# Patient Record
Sex: FEMALE | Race: WHITE | Employment: FULL TIME | URBAN - METROPOLITAN AREA
[De-identification: names, ages, dates, MRNs, and addresses within clinical notes are randomized per-mention and may not be internally consistent; named-entity substitution may affect disease eponyms.]

---

## 2022-09-15 ENCOUNTER — HOSPITAL ENCOUNTER (OUTPATIENT)
Age: 22
Discharge: HOME | End: 2022-09-15

## 2022-12-12 ENCOUNTER — NURSE TRIAGE (OUTPATIENT)
Dept: OTHER | Facility: CLINIC | Age: 22
End: 2022-12-12

## 2022-12-12 ENCOUNTER — HOSPITAL ENCOUNTER (EMERGENCY)
Age: 22
Discharge: HOME | End: 2022-12-12
Attending: STUDENT IN AN ORGANIZED HEALTH CARE EDUCATION/TRAINING PROGRAM
Payer: COMMERCIAL

## 2022-12-12 VITALS
SYSTOLIC BLOOD PRESSURE: 156 MMHG | RESPIRATION RATE: 17 BRPM | OXYGEN SATURATION: 100 % | DIASTOLIC BLOOD PRESSURE: 98 MMHG | HEART RATE: 72 BPM

## 2022-12-12 DIAGNOSIS — M54.50 ACUTE LEFT-SIDED LOW BACK PAIN WITHOUT SCIATICA: Primary | ICD-10-CM

## 2022-12-12 PROCEDURE — 99284 EMERGENCY DEPT VISIT MOD MDM: CPT

## 2022-12-12 PROCEDURE — 96372 THER/PROPH/DIAG INJ SC/IM: CPT

## 2022-12-12 PROCEDURE — 6370000000 HC RX 637 (ALT 250 FOR IP): Performed by: STUDENT IN AN ORGANIZED HEALTH CARE EDUCATION/TRAINING PROGRAM

## 2022-12-12 PROCEDURE — 6360000002 HC RX W HCPCS: Performed by: STUDENT IN AN ORGANIZED HEALTH CARE EDUCATION/TRAINING PROGRAM

## 2022-12-12 RX ORDER — ACETAMINOPHEN 325 MG/1
650 TABLET ORAL ONCE
Status: COMPLETED | OUTPATIENT
Start: 2022-12-12 | End: 2022-12-12

## 2022-12-12 RX ORDER — KETOROLAC TROMETHAMINE 30 MG/ML
30 INJECTION, SOLUTION INTRAMUSCULAR; INTRAVENOUS ONCE
Status: COMPLETED | OUTPATIENT
Start: 2022-12-12 | End: 2022-12-12

## 2022-12-12 RX ORDER — ORPHENADRINE CITRATE 100 MG/1
100 TABLET, EXTENDED RELEASE ORAL 2 TIMES DAILY
Qty: 20 TABLET | Refills: 0 | Status: SHIPPED | OUTPATIENT
Start: 2022-12-12 | End: 2022-12-22

## 2022-12-12 RX ORDER — ORPHENADRINE CITRATE 30 MG/ML
60 INJECTION INTRAMUSCULAR; INTRAVENOUS ONCE
Status: COMPLETED | OUTPATIENT
Start: 2022-12-12 | End: 2022-12-12

## 2022-12-12 RX ORDER — LIDOCAINE 4 G/G
1 PATCH TOPICAL DAILY
Status: DISCONTINUED | OUTPATIENT
Start: 2022-12-12 | End: 2022-12-12 | Stop reason: HOSPADM

## 2022-12-12 RX ADMIN — ACETAMINOPHEN 650 MG: 325 TABLET ORAL at 18:51

## 2022-12-12 RX ADMIN — KETOROLAC TROMETHAMINE 30 MG: 30 INJECTION, SOLUTION INTRAMUSCULAR; INTRAVENOUS at 18:51

## 2022-12-12 RX ADMIN — ORPHENADRINE CITRATE 60 MG: 30 INJECTION INTRAMUSCULAR; INTRAVENOUS at 18:50

## 2022-12-12 ASSESSMENT — ENCOUNTER SYMPTOMS
SINUS PRESSURE: 0
COUGH: 0
COLOR CHANGE: 0
VOMITING: 0
ABDOMINAL PAIN: 0
SHORTNESS OF BREATH: 0
NAUSEA: 0
WHEEZING: 0
DIARRHEA: 0
SORE THROAT: 0
BACK PAIN: 1
CONSTIPATION: 0
CHEST TIGHTNESS: 0
SINUS PAIN: 0

## 2022-12-12 ASSESSMENT — PAIN - FUNCTIONAL ASSESSMENT: PAIN_FUNCTIONAL_ASSESSMENT: 0-10

## 2022-12-12 ASSESSMENT — PAIN DESCRIPTION - ORIENTATION: ORIENTATION: LOWER

## 2022-12-12 ASSESSMENT — PAIN DESCRIPTION - PAIN TYPE: TYPE: ACUTE PAIN

## 2022-12-12 ASSESSMENT — PAIN DESCRIPTION - LOCATION: LOCATION: BACK

## 2022-12-12 ASSESSMENT — PAIN SCALES - GENERAL: PAINLEVEL_OUTOF10: 8

## 2022-12-12 NOTE — TELEPHONE ENCOUNTER
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     Patient/caller agrees to proceed to [address] [place] [address] Emergency Department      Reason for Disposition   Large swelling or bruise > 2 inches (5 cm)    Protocols used: Back Injury-ADULT-

## 2022-12-12 NOTE — Clinical Note
Roge Jama was seen and treated in our emergency department on 12/12/2022. She may return to work on 12/13/2022. If you have any questions or concerns, please don't hesitate to call.       Vesta Gonzalez, DO

## 2022-12-12 NOTE — ED NOTES
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 Pt states the patient fell backwards, pulling the patient forward. Pt states she felt her left lower back stretch and it suddenly felt hot/cold.       Phil Emery RN  12/12/22 0065

## 2022-12-12 NOTE — ED PROVIDER NOTES
[facility] ENCOUNTER        Pt Name: Sherlyn Velasquez  MRN: [id number]  [place] 2000  Date of evaluation: 12/12/2022  Treating Resident Physician: Edel Hancock DO  Supervising Physician: Marcia Harrison    History obtained from chart review and the patient. CHIEF COMPLAINT       Chief Complaint   Patient presents with    Back Pain           HISTORY OF PRESENT ILLNESS    HPI  Sherlyn Velasquez is a 20 y.o. female who presents to the emergency department for evaluation of left low back pain. Patient states that she was working upstairs and was involved in lifting an obese patient when the patient could not stand any longer and try to sit back down. Nya Baptiste was supporting the patient with a gait belt from the front side and was pulled forward abruptly causing her to bend forward. No direct trauma to the back, no head strike, no loss of consciousness. Patient states that she has history of quadriceps muscle tear and this feels very similar to a muscular injury in her left lower back. She is ambulatory and denies any current numbness, tingling or weakness. Sent here directly for her discomfort and evaluation. The patient has no other acute complaints at this time. REVIEW OF SYSTEMS   Review of Systems   Constitutional:  Negative for chills and fever. HENT:  Negative for sinus pressure, sinus pain and sore throat. Respiratory:  Negative for cough, chest tightness, shortness of breath and wheezing. Cardiovascular:  Negative for chest pain, palpitations and leg swelling. Gastrointestinal:  Negative for abdominal pain, constipation, diarrhea, nausea and vomiting. Endocrine: Negative for cold intolerance and heat intolerance. Genitourinary:  Negative for difficulty urinating and dysuria. Musculoskeletal:  Positive for back pain and myalgias. Negative for neck pain and neck stiffness. Skin:  Negative for color change and rash. Neurological:  Negative for dizziness, syncope, weakness, light-headedness, numbness and headaches. PAST MEDICAL AND SURGICAL HISTORY   No past medical history on file. No past surgical history on file. MEDICATIONS     Current Facility-Administered Medications:     lidocaine 4 % external patch 1 patch, 1 patch, TransDERmal, Daily, Yessenia Velasquez DO, 1 patch at 12/12/22 1851    Current Outpatient Medications:     orphenadrine (NORFLEX) 100 MG extended release tablet, Take 1 tablet by mouth 2 times daily for 10 days, Disp: 20 tablet, Rfl: 0    diclofenac sodium (VOLTAREN) 1 % GEL, Apply 4 g topically 4 times daily as needed for Pain, Disp: 100 g, Rfl: 0      SOCIAL HISTORY     Social History     Social History Narrative    Not on file            ALLERGIES   Not on File      FAMILY HISTORY   No family history on file. PREVIOUS RECORDS   Previous records reviewed: This is this patient's first visit to [facility] ED, no previous records available on EMR. .        PHYSICAL EXAM     ED Triage Vitals [12/12/22 1701]   BP Temp Temp src Heart Rate Resp SpO2 Height Weight   (!) 153/117 -- -- 90 18 99 % -- --     Initial vital signs and nursing assessment reviewed and normal. There is no height or weight on file to calculate BMI. Pulsoximetry is normal per my interpretation. Additional Vital Signs:  Vitals:    12/12/22 1849   BP: (!) 156/98   Pulse: 72   Resp: 17   SpO2: 100%       Physical Exam  Constitutional:       General: She is not in acute distress. Appearance: She is not ill-appearing, toxic-appearing or diaphoretic. HENT:      Head: Normocephalic and atraumatic. Mouth/Throat:      Mouth: Mucous membranes are moist.      Pharynx: Oropharynx is clear. No oropharyngeal exudate or posterior oropharyngeal erythema. Eyes:      General: No scleral icterus. Right eye: No discharge. Left eye: No discharge. Extraocular Movements: Extraocular movements intact. Conjunctiva/sclera: Conjunctivae normal.   Cardiovascular:      Rate and Rhythm: Normal rate and regular rhythm. Pulses: Normal pulses. Heart sounds: No murmur heard. No friction rub. No gallop. Pulmonary:      Effort: Pulmonary effort is normal.      Breath sounds: No wheezing, rhonchi or rales. Abdominal:      Palpations: Abdomen is soft. Tenderness: There is no abdominal tenderness. There is no guarding or rebound. Musculoskeletal:         General: Tenderness (Left lumbar paraspinal musculature) present. Cervical back: Normal range of motion. No rigidity. Right lower leg: No edema. Left lower leg: No edema. Skin:     General: Skin is warm and dry. Capillary Refill: Capillary refill takes less than 2 seconds. Neurological:      General: No focal deficit present. Mental Status: She is alert and oriented to person, place, and time. Sensory: No sensory deficit. Motor: No weakness. Coordination: Coordination normal.      Gait: Gait normal.      Deep Tendon Reflexes: Reflexes normal (Patellar reflexes 2 out of 4 bilaterally). MEDICAL DECISION MAKING   Initial Assessment:   Very pleasant 79-year-old female coming in for acute onset left low back pain. Occurred while lifting a patient with a gait belt. Abruptly pulled forward and jerked causing left paraspinal muscle pain. No midline step-offs or deformities. Patient is ambulatory. Equal sensation and pulses bilaterally. No red flag symptoms. No overlying ecchymosis, erythema or rashes. No red flag symptoms for bony or severe neurologic injury. Suspect musculoskeletal sprain, sprain, unlikely fracture, cord compression, cauda equina, abscess, ureterolithiasis, cystitis or pyelonephritis.     Plan:   Analgesia  Muscle relaxer  Reassurance  Work note, appointment made with occupational health for tomorrow morning  Discharge  Follow-up with PCP or [facility] [place]  Patient educated on red flag symptoms to prompt immediate return        ED RESULTS   Laboratory results:  Labs Reviewed - No data to display    Radiologic studies results:  No orders to display       ED Medications administered this visit:   Medications   lidocaine 4 % external patch 1 patch (1 patch TransDERmal Patch Applied 12/12/22 1851)   ketorolac (TORADOL) injection 30 mg (30 mg IntraMUSCular Given 12/12/22 1851)   orphenadrine (NORFLEX) injection 60 mg (60 mg IntraMUSCular Given 12/12/22 1850)   acetaminophen (TYLENOL) tablet 650 mg (650 mg Oral Given 12/12/22 1851)         ED COURSE     ED Course as of 12/12/22 1926   Mon Dec 12, 2022   1720 Based my physical examination and lack of neurologic findings and lack of midline step-offs, deformity or tenderness there is no need at this time for plain film or CT imaging of the spine. [EM]      ED Course User Index  [EM] Yessenia Velasquez DO         Strict return precautions and follow up instructions were discussed with the patient prior to discharge, with which the patient agrees. MEDICATION CHANGES     New Prescriptions    DICLOFENAC SODIUM (VOLTAREN) 1 % GEL    Apply 4 g topically 4 times daily as needed for Pain    ORPHENADRINE (NORFLEX) 100 MG EXTENDED RELEASE TABLET    Take 1 tablet by mouth 2 times daily for 10 days         FINAL DISPOSITION     Final diagnoses:   Acute left-sided low back pain without sciatica     Condition: condition: stable  Dispo: Discharge to home      This transcription was electronically signed. Parts of this transcriptions may have been dictated by use of voice recognition software and electronically transcribed, and parts may have been transcribed with the assistance of an ED scribe. The transcription may contain errors not detected in proofreading. Please refer to my supervising physician's documentation if my documentation differs.     Electronically Signed: Yessenia Velasquez DO, 12/12/22, 7:26 PM        Yessenia Velasquez DO  Resident  12/12/22 1926

## 2022-12-12 NOTE — Clinical Note
Britt Marvin was seen and treated in our emergency department on 12/12/2022. She may return to work on 12/13/2022. If you have any questions or concerns, please don't hesitate to call.       Chu Hooper, DO

## 2022-12-13 NOTE — DISCHARGE INSTRUCTIONS
Please read the instructions attached for back pain. Utilize Motrin and Tylenol per package instructions. You may also choose to use ice or heat for 15 minutes at a time throughout the day. You have an appointment tomorrow with occupational health. Please follow-up for return to work instructions. As we discussed, if you begin having any weakness or numbness return to the emergency department immediately.

## 2023-08-23 ENCOUNTER — NURSE ONLY (OUTPATIENT)
Dept: LAB | Age: 23
End: 2023-08-23

## 2023-08-30 LAB — CYTOLOGY THIN PREP PAP: NORMAL

## 2024-10-30 ENCOUNTER — HOSPITAL ENCOUNTER (EMERGENCY)
Age: 24
Discharge: HOME | End: 2024-10-30
Payer: COMMERCIAL

## 2024-10-30 VITALS
SYSTOLIC BLOOD PRESSURE: 129 MMHG | HEIGHT: 70 IN | RESPIRATION RATE: 20 BRPM | TEMPERATURE: 99.1 F | BODY MASS INDEX: 41.95 KG/M2 | DIASTOLIC BLOOD PRESSURE: 92 MMHG | WEIGHT: 293 LBS | OXYGEN SATURATION: 97 % | HEART RATE: 119 BPM

## 2024-10-30 DIAGNOSIS — B34.9 VIRAL ILLNESS: Primary | ICD-10-CM

## 2024-10-30 LAB
BUN BLD-MCNC: 10 MG/DL (ref 8–26)
CHLORIDE BLD-SCNC: 104 MEQ/L (ref 98–109)
CO2 BLD CALC-SCNC: 22 MEQ/L (ref 23–33)
CREAT BLD-MCNC: 0.8 MG/DL (ref 0.5–1.2)
ERYTHROCYTE [DISTWIDTH] IN BLOOD BY AUTOMATED COUNT: 12.2 % (ref 11.5–14.5)
FLUAV RNA RESP QL NAA+PROBE: NOT DETECTED
FLUBV RNA RESP QL NAA+PROBE: NOT DETECTED
GFR SERPL CREATININE-BSD FRML MDRD: > 90 ML/MIN/1.73M2
GLUCOSE BLD-MCNC: 109 MG/DL (ref 70–108)
HCT VFR BLD AUTO: 36.3 % (ref 37–47)
HGB BLD-MCNC: 12.8 GM/DL (ref 12–16)
MCH RBC QN AUTO: 32 PG (ref 27–31)
MCHC RBC AUTO-ENTMCNC: 35.3 GM/DL (ref 33–37)
MCV RBC AUTO: 91 FL (ref 81–99)
PLATELET # BLD AUTO: 274 THOU/MM3 (ref 130–400)
PMV BLD AUTO: 9.8 FL (ref 7.4–10.4)
POTASSIUM BLD-SCNC: 4.2 MEQ/L (ref 3.5–4.9)
RBC # BLD AUTO: 4 MILL/MM3 (ref 4.2–5.4)
S PYO AG THROAT QL: NEGATIVE
SARS-COV-2 RNA RESP QL NAA+PROBE: NOT DETECTED
SODIUM BLD-SCNC: 137 MEQ/L (ref 138–146)
WBC # BLD AUTO: 11 THOU/MM3 (ref 4.8–10.8)

## 2024-10-30 PROCEDURE — 80051 ELECTROLYTE PANEL: CPT

## 2024-10-30 PROCEDURE — 87651 STREP A DNA AMP PROBE: CPT

## 2024-10-30 PROCEDURE — 84520 ASSAY OF UREA NITROGEN: CPT

## 2024-10-30 PROCEDURE — 85027 COMPLETE CBC AUTOMATED: CPT

## 2024-10-30 PROCEDURE — 87636 SARSCOV2 & INF A&B AMP PRB: CPT

## 2024-10-30 PROCEDURE — 36415 COLL VENOUS BLD VENIPUNCTURE: CPT

## 2024-10-30 PROCEDURE — 99213 OFFICE O/P EST LOW 20 MIN: CPT

## 2024-10-30 PROCEDURE — 6370000000 HC RX 637 (ALT 250 FOR IP): Performed by: NURSE PRACTITIONER

## 2024-10-30 PROCEDURE — 99214 OFFICE O/P EST MOD 30 MIN: CPT | Performed by: NURSE PRACTITIONER

## 2024-10-30 PROCEDURE — 82565 ASSAY OF CREATININE: CPT

## 2024-10-30 PROCEDURE — 82947 ASSAY GLUCOSE BLOOD QUANT: CPT

## 2024-10-30 RX ORDER — IBUPROFEN 200 MG
600 TABLET ORAL ONCE
Status: COMPLETED | OUTPATIENT
Start: 2024-10-30 | End: 2024-10-30

## 2024-10-30 RX ORDER — ONDANSETRON 4 MG/1
4 TABLET, ORALLY DISINTEGRATING ORAL 3 TIMES DAILY PRN
Qty: 21 TABLET | Refills: 0 | Status: SHIPPED | OUTPATIENT
Start: 2024-10-30

## 2024-10-30 RX ORDER — ACETAMINOPHEN 500 MG
1000 TABLET ORAL EVERY 6 HOURS PRN
COMMUNITY

## 2024-10-30 RX ORDER — ONDANSETRON 4 MG/1
4 TABLET, ORALLY DISINTEGRATING ORAL ONCE
Status: COMPLETED | OUTPATIENT
Start: 2024-10-30 | End: 2024-10-30

## 2024-10-30 RX ADMIN — IBUPROFEN 600 MG: 200 TABLET, FILM COATED ORAL at 16:58

## 2024-10-30 RX ADMIN — ONDANSETRON 4 MG: 4 TABLET, ORALLY DISINTEGRATING ORAL at 16:59

## 2024-10-30 ASSESSMENT — PAIN - FUNCTIONAL ASSESSMENT: PAIN_FUNCTIONAL_ASSESSMENT: 0-10

## 2024-10-30 ASSESSMENT — PAIN SCALES - GENERAL: PAINLEVEL_OUTOF10: 6

## 2024-10-30 ASSESSMENT — PAIN DESCRIPTION - ORIENTATION: ORIENTATION: POSTERIOR

## 2024-10-30 ASSESSMENT — PAIN DESCRIPTION - LOCATION: LOCATION: HEAD

## 2024-10-30 ASSESSMENT — PAIN DESCRIPTION - DESCRIPTORS: DESCRIPTORS: PRESSURE

## 2024-10-30 NOTE — ED PROVIDER NOTES
[facility] URGENT CARE  UrgentCare Encounter      CHIEFCOMPLAINT       Chief Complaint   Patient presents with    Vomiting    Nausea    Headache    Fever       Nurses Notes reviewed and I agree except as noted in the HPI.  HISTORY OF PRESENT ILLNESS   Neva Pina is a 24 y.o. female who presents to urgent care with complaints of vomiting, nausea, headache, fever.  Symptom onset was approximately 3 PM this afternoon.    REVIEW OF SYSTEMS     Review of Systems   Gastrointestinal:  Positive for diarrhea, nausea and vomiting.       PAST MEDICAL HISTORY   History reviewed. No pertinent past medical history.    SURGICAL HISTORY     Patient  has no past surgical history on file.    CURRENT MEDICATIONS       Discharge Medication List as of 10/30/2024  5:36 PM        CONTINUE these medications which have NOT CHANGED    Details   acetaminophen (TYLENOL) 500 MG tablet Take 2 tablets by mouth every 6 hours as needed for PainHistorical Med             ALLERGIES     Patient is is allergic to penicillins.    FAMILY HISTORY     Patient'sfamily history is not on file.    SOCIAL HISTORY     Patient      PHYSICAL EXAM     ED TRIAGE VITALS  BP: (!) 129/92, Temp: 99.1 °F (37.3 °C), Pulse: (!) 119, Respirations: 20, SpO2: 97 %  Physical Exam  Vitals and nursing note reviewed.   Constitutional:       General: She is not in acute distress.     Appearance: Normal appearance. She is not ill-appearing or toxic-appearing.   HENT:      Head: Normocephalic and atraumatic.      Nose: No congestion or rhinorrhea.      Mouth/Throat:      Mouth: Mucous membranes are moist.      Pharynx: Oropharynx is clear.   Eyes:      Extraocular Movements: Extraocular movements intact.      Conjunctiva/sclera: Conjunctivae normal.      Pupils: Pupils are equal, round, and reactive to light.   Cardiovascular:      Rate and Rhythm: Normal rate and regular rhythm.      Pulses: Normal pulses.      Heart sounds: Normal heart sounds. No murmur  heard.  Pulmonary:      Effort: Pulmonary effort is normal. No respiratory distress.      Breath sounds: Normal breath sounds. No wheezing.   Abdominal:      General: Abdomen is flat.      Palpations: Abdomen is soft.      Tenderness: There is no abdominal tenderness.   Musculoskeletal:         General: No swelling or deformity. Normal range of motion.      Cervical back: Normal range of motion and neck supple.   Skin:     General: Skin is warm and dry.      Capillary Refill: Capillary refill takes less than 2 seconds.      Findings: No rash.   Neurological:      General: No focal deficit present.      Mental Status: She is alert and oriented to person, place, and time. Mental status is at baseline.   Psychiatric:         Mood and Affect: Mood normal.         Behavior: Behavior normal.         Thought Content: Thought content normal.         Judgment: Judgment normal.         DIAGNOSTIC RESULTS   Labs:  Results for orders placed or performed during the hospital encounter of 10/30/24   COVID-19 & Influenza Combo    Specimen: Nasopharyngeal Swab   Result Value Ref Range    SARS-CoV-2 RNA, RT PCR NOT DETECTED NOT DETECTED    Influenza A NOT DETECTED NOT DETECTED    Influenza B NOT DETECTED NOT DETECTED   Strep Screen Group A Throat   Result Value Ref Range    Rapid Strep A Screen NEGATIVE    CBC   Result Value Ref Range    WBC 11.0 (H) 4.8 - 10.8 thou/mm3    RBC 4.00 (L) 4.20 - 5.40 mill/mm3    Hemoglobin 12.8 12.0 - 16.0 gm/dl    Hematocrit 36.3 (L) 37.0 - 47.0 %    MCV 91 81 - 99 fL    MCH 32.0 (H) 27.0 - 31.0 pg    MCHC 35.3 33.0 - 37.0 gm/dl    RDW 12.2 11.5 - 14.5 %    Platelets 274 130 - 400 thou/mm3    MPV 9.8 7.4 - 10.4 fL   POC Basic Metabol Panel without Calcium   Result Value Ref Range    Sodium, Whole Blood 137 (L) 138 - 146 meq/l    Potassium, Whole Blood 4.2 3.5 - 4.9 meq/l    Chloride, Whole Blood 104 98 - 109 meq/l    Total CO2, Venous 22 (L) 23 - 33 meq/l    Glucose, Whole Blood 109 (H) 70 - 108 mg/dl

## 2024-10-30 NOTE — ED NOTES
Pt with complaints of nausea, vomiting, headache and fever that started today. States she tried tylenol for fever but it has not helped.     Gage Caraballo, BROOK  10/30/24 9810

## 2024-10-31 ASSESSMENT — ENCOUNTER SYMPTOMS
NAUSEA: 1
VOMITING: 1
DIARRHEA: 1

## 2025-05-08 ENCOUNTER — HOSPITAL ENCOUNTER (EMERGENCY)
Age: 25
Discharge: HOME | End: 2025-05-08
Attending: EMERGENCY MEDICINE
Payer: COMMERCIAL

## 2025-05-08 ENCOUNTER — APPOINTMENT (OUTPATIENT)
Age: 25
End: 2025-05-08
Payer: COMMERCIAL

## 2025-05-08 ENCOUNTER — APPOINTMENT (OUTPATIENT)
Dept: GENERAL RADIOLOGY | Age: 25
End: 2025-05-08
Payer: COMMERCIAL

## 2025-05-08 VITALS
HEART RATE: 79 BPM | TEMPERATURE: 99.4 F | OXYGEN SATURATION: 97 % | HEIGHT: 71 IN | DIASTOLIC BLOOD PRESSURE: 87 MMHG | WEIGHT: 293 LBS | SYSTOLIC BLOOD PRESSURE: 140 MMHG | RESPIRATION RATE: 20 BRPM | BODY MASS INDEX: 41.02 KG/M2

## 2025-05-08 DIAGNOSIS — R00.1 BRADYCARDIA: Primary | ICD-10-CM

## 2025-05-08 LAB
ALBUMIN SERPL BCG-MCNC: 4.3 G/DL (ref 3.4–4.9)
ALP SERPL-CCNC: 59 U/L (ref 38–126)
ALT SERPL W/O P-5'-P-CCNC: 164 U/L (ref 10–35)
ANION GAP SERPL CALC-SCNC: 11 MEQ/L (ref 8–16)
AST SERPL-CCNC: 78 U/L (ref 10–35)
B-HCG SERPL QL: NEGATIVE
BASOPHILS ABSOLUTE: 0 THOU/MM3 (ref 0–0.1)
BASOPHILS NFR BLD AUTO: 0.5 %
BILIRUB SERPL-MCNC: 0.4 MG/DL (ref 0.3–1.2)
BUN SERPL-MCNC: 15 MG/DL (ref 8–23)
CA-I BLD ISE-SCNC: 1.17 MMOL/L (ref 1.12–1.32)
CALCIUM SERPL-MCNC: 9.6 MG/DL (ref 8.6–10)
CHLORIDE SERPL-SCNC: 103 MEQ/L (ref 98–111)
CO2 SERPL-SCNC: 25 MEQ/L (ref 22–29)
CREAT SERPL-MCNC: 0.9 MG/DL (ref 0.5–0.9)
D DIMER PPP IA.FEU-MCNC: 375 NG/ML FEU (ref 0–500)
DEPRECATED RDW RBC AUTO: 40.3 FL (ref 35–45)
ECHO BSA: 2.61 M2
EKG ATRIAL RATE: 93 BPM
EKG P AXIS: 52 DEGREES
EKG P-R INTERVAL: 160 MS
EKG Q-T INTERVAL: 344 MS
EKG QRS DURATION: 82 MS
EKG QTC CALCULATION (BAZETT): 427 MS
EKG R AXIS: 55 DEGREES
EKG T AXIS: 68 DEGREES
EKG VENTRICULAR RATE: 93 BPM
EOSINOPHIL NFR BLD AUTO: 1.9 %
EOSINOPHILS ABSOLUTE: 0.1 THOU/MM3 (ref 0–0.4)
ERYTHROCYTE [DISTWIDTH] IN BLOOD BY AUTOMATED COUNT: 12 % (ref 11.5–14.5)
GFR SERPL CREATININE-BSD FRML MDRD: 90 ML/MIN/1.73M2
GLUCOSE SERPL-MCNC: 95 MG/DL (ref 74–109)
HCT VFR BLD AUTO: 38.8 % (ref 37–47)
HGB BLD-MCNC: 13.2 GM/DL (ref 12–16)
IMM GRANULOCYTES # BLD AUTO: 0.03 THOU/MM3 (ref 0–0.07)
IMM GRANULOCYTES NFR BLD AUTO: 0.4 %
LYMPHOCYTES ABSOLUTE: 2 THOU/MM3 (ref 1–4.8)
LYMPHOCYTES NFR BLD AUTO: 27.3 %
MAGNESIUM SERPL-MCNC: 2.2 MG/DL (ref 1.6–2.6)
MCH RBC QN AUTO: 31.4 PG (ref 26–33)
MCHC RBC AUTO-ENTMCNC: 34 GM/DL (ref 32.2–35.5)
MCV RBC AUTO: 92.4 FL (ref 81–99)
MONOCYTES ABSOLUTE: 0.5 THOU/MM3 (ref 0.4–1.3)
MONOCYTES NFR BLD AUTO: 7.3 %
NEUTROPHILS ABSOLUTE: 4.6 THOU/MM3 (ref 1.8–7.7)
NEUTROPHILS NFR BLD AUTO: 62.6 %
NRBC BLD AUTO-RTO: 0 /100 WBC
OSMOLALITY SERPL CALC.SUM OF ELEC: 278.2 MOSMOL/KG (ref 275–300)
PLATELET # BLD AUTO: 313 THOU/MM3 (ref 130–400)
PMV BLD AUTO: 10.1 FL (ref 9.4–12.4)
POTASSIUM SERPL-SCNC: 4 MEQ/L (ref 3.5–5.2)
PROT SERPL-MCNC: 7.7 G/DL (ref 6.4–8.3)
RBC # BLD AUTO: 4.2 MILL/MM3 (ref 4.2–5.4)
SODIUM SERPL-SCNC: 139 MEQ/L (ref 135–145)
T4 FREE SERPL-MCNC: 1.3 NG/DL (ref 0.92–1.68)
TROPONIN, HIGH SENSITIVITY: < 6 NG/L (ref 0–12)
TSH SERPL DL<=0.05 MIU/L-ACNC: 1.25 UIU/ML (ref 0.27–4.2)
WBC # BLD AUTO: 7.3 THOU/MM3 (ref 4.8–10.8)

## 2025-05-08 PROCEDURE — 36415 COLL VENOUS BLD VENIPUNCTURE: CPT

## 2025-05-08 PROCEDURE — 84484 ASSAY OF TROPONIN QUANT: CPT

## 2025-05-08 PROCEDURE — 93242 EXT ECG>48HR<7D RECORDING: CPT

## 2025-05-08 PROCEDURE — 84703 CHORIONIC GONADOTROPIN ASSAY: CPT

## 2025-05-08 PROCEDURE — 82330 ASSAY OF CALCIUM: CPT

## 2025-05-08 PROCEDURE — 93010 ELECTROCARDIOGRAM REPORT: CPT | Performed by: INTERNAL MEDICINE

## 2025-05-08 PROCEDURE — 99285 EMERGENCY DEPT VISIT HI MDM: CPT

## 2025-05-08 PROCEDURE — 85379 FIBRIN DEGRADATION QUANT: CPT

## 2025-05-08 PROCEDURE — 71046 X-RAY EXAM CHEST 2 VIEWS: CPT

## 2025-05-08 PROCEDURE — 84443 ASSAY THYROID STIM HORMONE: CPT

## 2025-05-08 PROCEDURE — 80053 COMPREHEN METABOLIC PANEL: CPT

## 2025-05-08 PROCEDURE — 83735 ASSAY OF MAGNESIUM: CPT

## 2025-05-08 PROCEDURE — 84439 ASSAY OF FREE THYROXINE: CPT

## 2025-05-08 PROCEDURE — 85025 COMPLETE CBC W/AUTO DIFF WBC: CPT

## 2025-05-08 PROCEDURE — 93005 ELECTROCARDIOGRAM TRACING: CPT

## 2025-05-08 ASSESSMENT — PAIN SCALES - GENERAL
PAINLEVEL_OUTOF10: 5
PAINLEVEL_OUTOF10: 7

## 2025-05-08 ASSESSMENT — PAIN - FUNCTIONAL ASSESSMENT
PAIN_FUNCTIONAL_ASSESSMENT: 0-10
PAIN_FUNCTIONAL_ASSESSMENT: 0-10
PAIN_FUNCTIONAL_ASSESSMENT: NONE - DENIES PAIN

## 2025-05-08 ASSESSMENT — PAIN DESCRIPTION - PAIN TYPE: TYPE: ACUTE PAIN

## 2025-05-08 ASSESSMENT — HEART SCORE: ECG: NON-SPECIFC REPOLARIZATION DISTURBANCE/LBTB/PM

## 2025-05-08 ASSESSMENT — PAIN DESCRIPTION - LOCATION
LOCATION: CHEST
LOCATION: CHEST

## 2025-05-08 NOTE — DISCHARGE INSTRUCTIONS
Your evaluated in the emergency department today for symptomatic bradycardia, which is low heart rate.  You will be discharged home with a Holter monitor, which to monitor your heart rate for the period of 3 days.  I scheduled you for your 1-Click appoint with her family medicine resident clinic at 3:20 PM on Tuesday, May 13, 2010 25.  Please go to this appointment for further evaluation related to your symptoms, and for follow-up related to your abnormal laboratory studies here in the Emergency Department, which included an elevated AST and ALT, which are markers of liver inflammation.  This can occur after administration of medication you are taking at home for weight loss.  Please come back to the ED should you develop any syncopal, or fainting episodes.

## 2025-05-08 NOTE — ED NOTES
Pt reassessed at this time. Pt resting on cot, denies any needs. Vitals stable with telemetry in place. Call light in reach.

## 2025-05-08 NOTE — ED PROVIDER NOTES
[facility] EMERGENCY DEPARTMENT  EMERGENCY DEPARTMENT ENCOUNTER          Pt Name: Neva Pina  MRN: [id number]  Birthdate 2000  Date of evaluation: 5/8/2025  Physician: Don Rich MD  Supervising Attending Physician: Teresita Contreras MD       CHIEF COMPLAINT       Chief Complaint   Patient presents with    Chest Pain    Shortness of Breath         HISTORY OF PRESENT ILLNESS    HPI  Neva Pina is a 25 y.o. female who presents to the emergency department  from work ,  ambulation  for evaluation of chest pain, shortness of breath, and low heart rate.  The patient reports that she recently had yesterday approximately 1 hour prior to arrival in the ED, when she developed shortness of breath and chest pain, noticed that her heart rate was low on her Apple Watch, reading about 40 bpm,.  She went back to check the historical record on her Apple Watch, and stated that her heart rate does dip into the high 30s low 40s randomly throughout the day, not associated with sleeping, she does not wear her watch while she sleeping.  The patient reports approximately 3 years ago which she is hospitalized for suspected meningitis with high fevers, she also was told that she had a low heart rate by nursing staff at that time.  The patient denies any medications that could contribute to low heart rate, only taking Ozempic, with a reported 10 pound weight loss over the past month or so.  The patient denies any history of cardiac disease in herself, but states her paternal grandfather did have a blood clot in the past and also a heart attack, causing him to die around the age of 65.  The patient denies any respiratory symptoms include cough, congestion, fever/chills, and also denies any chest trauma.  The patient, is currently resting comfortably in the bed, and when asked about chest pain or shortness breath, she states is largely resolved.  The patient denies any social drinking, smoking or drug 
Value    AST 78 (*)      (*)     All other components within normal limits   TSH   T4, FREE   TROPONIN   CBC WITH AUTO DIFFERENTIAL   MAGNESIUM   CALCIUM, IONIZED   HCG, SERUM, QUALITATIVE   D-DIMER, QUANTITATIVE   ANION GAP   OSMOLALITY   GLOMERULAR FILTRATION RATE, ESTIMATED        XR CHEST (2 VW)   Final Result      No acute intrathoracic process.               **This report has been created using voice recognition software. It may contain   minor errors which are inherent in voice recognition technology.**         Electronically signed by Dr. Chavez Butt          EKG: Completed at 1319 and interpreted by myself.  This demonstrates normal sinus rhythm rate of 93 bpm with a normal axis.  She has inverted T waves noted in V1.  There is no acute ST elevation.  QRS duration is 82 ms QTc 427 ms.  Interpretation: Normal EKG.     MEDICATIONS GIVEN:  No orders of the defined types were placed in this encounter.      Medical Decision Making   Potential Diagnoses Considered: Acute coronary syndrome, pulmonary embolus, electrolyte abnormality, hypothyroidism, medication side effect, amongst others not listed.   Assessment and Plan   Symptomatic bradycardia  Atypical CP      Please see the resident physician completed note for final disposition except as documented on this attestation.   I have reviewed and interpreted all available lab, radiology and ekg results available at the moment.  I reviewed laboratory and imagining results independently and clinically correlated the results.  Diagnosis, treatment and disposition plans were discussed and agreed upon by patient and/or their family.         DISPOSITION Decision To Discharge 05/08/2025 03:43:40 PM   This transcription was electronically signed. It was dictated by use of voice recognition software and electronically transcribed. The transcription may contain errors not detected in proofreading.       Electronically signed by Teresita Contreras MD on 5/8/25 at

## 2025-05-08 NOTE — ED TRIAGE NOTES
Pt to the ED with c/o chest pain and shortness of breath. Pt reports she has had these symptoms all week but today they worsened. Pt reports she has noticed periods of bradycardia when checking her apple watch also. EKG obtained upon arrival to ED. Pt respirations easy and unlabored.

## 2025-05-13 ENCOUNTER — OFFICE VISIT (OUTPATIENT)
Dept: FAMILY MEDICINE CLINIC | Age: 25
End: 2025-05-13
Payer: COMMERCIAL

## 2025-05-13 VITALS
HEIGHT: 71 IN | RESPIRATION RATE: 18 BRPM | HEART RATE: 75 BPM | OXYGEN SATURATION: 98 % | BODY MASS INDEX: 41.02 KG/M2 | TEMPERATURE: 98.5 F | WEIGHT: 293 LBS | SYSTOLIC BLOOD PRESSURE: 136 MMHG | DIASTOLIC BLOOD PRESSURE: 96 MMHG

## 2025-05-13 DIAGNOSIS — I10 PRIMARY HYPERTENSION: Primary | ICD-10-CM

## 2025-05-13 DIAGNOSIS — Z87.898 HISTORY OF BRADYCARDIA: ICD-10-CM

## 2025-05-13 DIAGNOSIS — E66.813 CLASS 3 SEVERE OBESITY WITHOUT SERIOUS COMORBIDITY WITH BODY MASS INDEX (BMI) OF 40.0 TO 44.9 IN ADULT, UNSPECIFIED OBESITY TYPE (HCC): ICD-10-CM

## 2025-05-13 PROCEDURE — 3075F SYST BP GE 130 - 139MM HG: CPT

## 2025-05-13 PROCEDURE — 3080F DIAST BP >= 90 MM HG: CPT

## 2025-05-13 PROCEDURE — 99204 OFFICE O/P NEW MOD 45 MIN: CPT

## 2025-05-13 RX ORDER — NIFEDIPINE 30 MG/1
30 TABLET, EXTENDED RELEASE ORAL DAILY
Qty: 60 TABLET | Refills: 0 | Status: SHIPPED | OUTPATIENT
Start: 2025-05-13

## 2025-05-13 SDOH — ECONOMIC STABILITY: FOOD INSECURITY: WITHIN THE PAST 12 MONTHS, YOU WORRIED THAT YOUR FOOD WOULD RUN OUT BEFORE YOU GOT MONEY TO BUY MORE.: NEVER TRUE

## 2025-05-13 SDOH — ECONOMIC STABILITY: FOOD INSECURITY: WITHIN THE PAST 12 MONTHS, THE FOOD YOU BOUGHT JUST DIDN'T LAST AND YOU DIDN'T HAVE MONEY TO GET MORE.: NEVER TRUE

## 2025-05-13 ASSESSMENT — PATIENT HEALTH QUESTIONNAIRE - PHQ9
SUM OF ALL RESPONSES TO PHQ QUESTIONS 1-9: 0
SUM OF ALL RESPONSES TO PHQ QUESTIONS 1-9: 0
1. LITTLE INTEREST OR PLEASURE IN DOING THINGS: NOT AT ALL
2. FEELING DOWN, DEPRESSED OR HOPELESS: NOT AT ALL
SUM OF ALL RESPONSES TO PHQ QUESTIONS 1-9: 0
SUM OF ALL RESPONSES TO PHQ QUESTIONS 1-9: 0

## 2025-05-13 NOTE — PROGRESS NOTES
S: 25 y.o. female with   Chief Complaint   Patient presents with    Follow-up     Doesn't feel as intense as ED visit.       HPI: please see resident note for HPI and ROS.    BP Readings from Last 3 Encounters:   05/13/25 (!) 136/96   05/08/25 (!) 140/87   10/30/24 (!) 129/92     Wt Readings from Last 3 Encounters:   05/13/25 (!) 139 kg (306 lb 6.4 oz)   05/08/25 136.1 kg (300 lb)   10/30/24 136.1 kg (300 lb)       O: VS:  height is 1.803 m (5' 11\") and weight is 139 kg (306 lb 6.4 oz) (abnormal). Her oral temperature is 98.5 °F (36.9 °C). Her blood pressure is 136/96 (abnormal) and her pulse is 75. Her respiration is 18 and oxygen saturation is 98%.   AAO/NAD, appropriate affect for mood       Diagnosis Orders   1. Primary hypertension  NIFEdipine (PROCARDIA XL) 30 MG extended release tablet      2. Class 3 severe obesity without serious comorbidity with body mass index (BMI) of 40.0 to 44.9 in adult, unspecified obesity type (HCC)        3. History of bradycardia            Plan:  Please refer to resident note for full plan.    25-year-old female here for ED follow up. Seen at [facility] ED on 5/8 for chest pain, SOB, and bradycardia (has been low in 30's per apple watch). No pre-syncope/syncope. EKG showed NSR. Urine pregnancy negative. Labs significant for elevated LFT's. Otherwise unremarkable. Chest x-ray was normal. Cardiac event monitor ordered and results are pending. Feeling better since ED. Plan to await results of cardiac event monitor and consider cardiology referral. BP elevated on several occasions. Agree with starting Nifedipine 30 mg daily. Follow up in 4 weeks.     Health Maintenance Due   Topic Date Due    HIV screen  Never done    Hepatitis C screen  Never done    HPV vaccine (2 - 3-dose series) 09/26/2017    DTaP/Tdap/Td vaccine (7 - Td or Tdap) 09/13/2021    COVID-19 Vaccine (3 - 2024-25 season) 09/01/2024       Attending Physician Statement  I have discussed the case, including pertinent history and

## 2025-05-13 NOTE — PROGRESS NOTES
Neva Pina is a 25 y.o. female who presents today for:  Chief Complaint   Patient presents with    Follow-up     Doesn't feel as intense as ED visit.     HPI:   Neva Pina is 25 y.o. who presents today for ED follow up.    Seen in ED on 5/8/25 for CP and some SOB and HR in the 30-40s noted on Apple watch x 2 days prior to ED visit -- feeling too tired.  Current meds include Ozempic, with noted 10 lbs weight loss over the last month.  Labs and imaging in ED unremarkable.  Concerns for symptomatic boy, discharged from ED with 3-day cardiac event monitor -- currently in process.    Today, pt presents for f/u.  Denies any symptoms of CP, SOB, palpitations, lightheadedness, blurry vision, any other associated symptoms at this time.  HR WNL (75) today.  Reports HR ranging from 30-140s, with max HR going up as high as 200s.  Patient reports episode of bradycardia at prior hospital stay x 3 years.  However, no recorded evidence or objective findings noted.  Denies any history of presyncopal or syncopal episodes, LOC associated with such episodes.  Denies any particular triggers, mood symptoms associated with this abnormalities and HR.  Does report drinking energy drinks -daily caffeine intake of up to 300 mg.  Never woken up from sleep with such symptoms    BP elevated at 140/98 -- > recheck at 136/96, in office today.  Elevated BP noted previously during ED visit as well.  (+) Fhx of HTN  and DM (paternal grandfather) -- unknown and first degree relatives     Also of note, LNMP in Feb but irregular bc of Hx of (+) PCOS -- not following with anyone for it.  Not on any meds.    No other acute concerns noted today.      Objective:     Vitals:    05/13/25 1519 05/13/25 1558   BP: (!) 140/98 (!) 136/96   BP Site: Right Upper Arm    Patient Position: Sitting    Pulse: 75    Resp: 18    Temp: 98.5 °F (36.9 °C)    TempSrc: Oral    SpO2: 98%    Weight: (!) 139 kg (306 lb 6.4 oz)    Height: 1.803 m (5' 11\")        Wt

## 2025-05-14 LAB — ECHO BSA: 2.61 M2

## 2025-06-25 ENCOUNTER — OFFICE VISIT (OUTPATIENT)
Dept: FAMILY MEDICINE CLINIC | Age: 25
End: 2025-06-25
Payer: COMMERCIAL

## 2025-06-25 VITALS
WEIGHT: 293 LBS | RESPIRATION RATE: 16 BRPM | BODY MASS INDEX: 41.02 KG/M2 | OXYGEN SATURATION: 97 % | DIASTOLIC BLOOD PRESSURE: 76 MMHG | HEART RATE: 66 BPM | TEMPERATURE: 98.2 F | SYSTOLIC BLOOD PRESSURE: 120 MMHG | HEIGHT: 71 IN

## 2025-06-25 DIAGNOSIS — R00.1 SYMPTOMATIC BRADYCARDIA: Primary | ICD-10-CM

## 2025-06-25 DIAGNOSIS — R00.0 SYMPTOMATIC TACHYCARDIA: ICD-10-CM

## 2025-06-25 DIAGNOSIS — E66.813 CLASS 3 SEVERE OBESITY WITHOUT SERIOUS COMORBIDITY WITH BODY MASS INDEX (BMI) OF 40.0 TO 44.9 IN ADULT, UNSPECIFIED OBESITY TYPE (HCC): ICD-10-CM

## 2025-06-25 DIAGNOSIS — R53.83 OTHER FATIGUE: ICD-10-CM

## 2025-06-25 DIAGNOSIS — R74.01 ELEVATED ALANINE AMINOTRANSFERASE (ALT) LEVEL: ICD-10-CM

## 2025-06-25 DIAGNOSIS — I10 PRIMARY HYPERTENSION: ICD-10-CM

## 2025-06-25 PROCEDURE — 3074F SYST BP LT 130 MM HG: CPT

## 2025-06-25 PROCEDURE — 3078F DIAST BP <80 MM HG: CPT

## 2025-06-25 PROCEDURE — 99214 OFFICE O/P EST MOD 30 MIN: CPT

## 2025-06-25 RX ORDER — NIFEDIPINE 30 MG/1
30 TABLET, EXTENDED RELEASE ORAL DAILY
Qty: 90 TABLET | Refills: 1 | Status: SHIPPED | OUTPATIENT
Start: 2025-06-25

## 2025-06-25 NOTE — PATIENT INSTRUCTIONS
Thank you   Thank you for trusting us with your healthcare needs. You may receive a survey regarding today's visit. It would help us out if you would take a few moments to provide your feedback. We value your input.  Please bring in ALL medications BOTTLES, including inhalers, herbal supplements, over the counter, prescribed & non-prescribed medicine. The office would like actual medication bottles and a list.         4.  Prior to getting your labs drawn, check with your insurance company for benefits and eligibility of lab services.  Often, insurance companies cover certain tests for preventative visits only.  It is patient's responsibility to    see what is covered.    5.  If the list below has been completed, PLEASE FAX RECORDS TO OUR OFFICE @ [phone]. Once the records have been received we will update your records at our office:  Health Maintenance Due   Topic Date Due    HIV screen  Never done    Hepatitis C screen  Never done    HPV vaccine (2 - 3-dose series) 09/26/2017    DTaP/Tdap/Td vaccine (7 - Td or Tdap) 09/13/2021    COVID-19 Vaccine (3 - 2024-25 season) 09/01/2024

## 2025-06-25 NOTE — PROGRESS NOTES
Health Maintenance Due   Topic Date Due    HIV screen  Never done    Hepatitis C screen  Never done    HPV vaccine (2 - 3-dose series) 09/26/2017    DTaP/Tdap/Td vaccine (7 - Td or Tdap) 09/13/2021    COVID-19 Vaccine (3 - 2024-25 season) 09/01/2024

## 2025-06-25 NOTE — PROGRESS NOTES
Neva Pina is a 25 y.o. female who presents today for:  Chief Complaint   Patient presents with    Follow-up     4-6 Week Follow-up/ [facility] still having issues with Heart Rate     HPI:   Neva Pina is 25 y.o. who presents today for 6 week follow up.    HTN -- well controlled on Procardia XL 30 QD, started at last visit.  /76 in office today.  WNL..      HR issues:  Bradycardia with HR as low as 40s, and tachycardia as high as 150s noted on Apple Watch.  Reports symptoms of fatigue/tiredness with bradycardia, chest tightness occasionally associated with tachycardia with such episodes.  Still continues to have such symptomatic episodes noted, especially when on Apple Watch.  No particular triggers noted.  No anxiety, panic attacks, mood changes noted in association with symptoms.  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  Since last visit, Patient reports stopping intake of energy drinks, reducing caffeine intake including pop overall.  Reports drinking coffee about 4 times a week.  Now drinks 2 cans of pop daily.  Patient previously taking Ozempic through compound pharmacy.  However stopped since last visit due to concerns for potential side effects.    3-day cardiac Holter monitor done 5/2025 noted NSR.  Negative for acute pathology, including A-fib, AV block, pauses, V. tach, SVT.  HR ranging from  bpm.  Average HR 83 bpm.  Prior labs overall unremarkable, in relation with above findings/symptoms.      Family hx -- (+) type 2 DM in grandparents, HTN in paternal side    = reduce pop and other sources of caffeine  = lifestyle mods    = referral to cardio??           Objective:     Vitals:    06/25/25 1357   BP: 120/76   BP Site: Right Upper Arm   Patient Position: Sitting   BP Cuff Size: Large Adult   Pulse: 66   Resp: 16   Temp: 98.2 °F (36.8 °C)   TempSrc: Oral   SpO2: 97%   Weight: (!) 140.8 kg (310 lb 6.4 oz)   Height: 1.803 m (5' 10.98\")       Wt Readings from

## 2025-07-10 ENCOUNTER — OFFICE VISIT (OUTPATIENT)
Dept: CARDIOLOGY CLINIC | Age: 25
End: 2025-07-10
Payer: COMMERCIAL

## 2025-07-10 VITALS
HEART RATE: 78 BPM | HEIGHT: 71 IN | SYSTOLIC BLOOD PRESSURE: 112 MMHG | DIASTOLIC BLOOD PRESSURE: 72 MMHG | BODY MASS INDEX: 41.02 KG/M2 | WEIGHT: 293 LBS

## 2025-07-10 DIAGNOSIS — R06.02 SOB (SHORTNESS OF BREATH) ON EXERTION: ICD-10-CM

## 2025-07-10 DIAGNOSIS — I10 PRIMARY HYPERTENSION: ICD-10-CM

## 2025-07-10 DIAGNOSIS — R00.9 ABNORMAL HEART RATE: ICD-10-CM

## 2025-07-10 DIAGNOSIS — R07.89 CHEST PAIN, ATYPICAL: Primary | ICD-10-CM

## 2025-07-10 PROCEDURE — 3078F DIAST BP <80 MM HG: CPT | Performed by: INTERNAL MEDICINE

## 2025-07-10 PROCEDURE — 3074F SYST BP LT 130 MM HG: CPT | Performed by: INTERNAL MEDICINE

## 2025-07-10 PROCEDURE — 99204 OFFICE O/P NEW MOD 45 MIN: CPT | Performed by: INTERNAL MEDICINE

## 2025-07-10 NOTE — PROGRESS NOTES
Chief Complaint   Patient presents with    New Patient    Bradycardia     Seen in ed for cp, bradycardia and tachycardia 5/8/25  New patient referral for bradycardia.  Pat follow her apple watch for HR  HR as low a 38  to 200 bpm noted     No more chest pain after ER visit on 5/8/25  The cp was sharp, 5/10, non radiating not exertion, pressure, 8/10, non radiating    EKG done 5-8-2025.    Denies cp, palpitations, dizziness,    Sob on exertion    C/O low HR 47-49 at 7:34 am today, fatigue and LAY.    Some leg edema at end of the day    Nonsmoker    FHX  Mother had Vasovegal  None for cad or cva    PMHX  HTN recently started on procardia        History reviewed. No pertinent surgical history.    Allergies   Allergen Reactions    Cat Dander     Dog Epithelium (Canis Lupus Familiaris)     Molds & Smuts     Penicillins         History reviewed. No pertinent family history.     Social History     Socioeconomic History    Marital status:      Spouse name: Not on file    Number of children: Not on file    Years of education: Not on file    Highest education level: Not on file   Occupational History    Not on file   Tobacco Use    Smoking status: Never    Smokeless tobacco: Never   Vaping Use    Vaping status: Never Used   Substance and Sexual Activity    Alcohol use: Yes     Comment: socially    Drug use: Never    Sexual activity: Not on file   Other Topics Concern    Not on file   Social History Narrative    Not on file     Social Drivers of Health     Financial Resource Strain: Not on file   Food Insecurity: No Food Insecurity (5/13/2025)    Hunger Vital Sign     Worried About Running Out of Food in the Last Year: Never true     Ran Out of Food in the Last Year: Never true   Transportation Needs: No Transportation Needs (5/13/2025)    PRAPARE - Transportation     Lack of Transportation (Medical): No     Lack of Transportation (Non-Medical): No   Physical Activity: Not on file   Stress: Not on file   Social

## 2025-08-14 ENCOUNTER — HOSPITAL ENCOUNTER (OUTPATIENT)
Age: 25
Discharge: HOME | End: 2025-08-16
Attending: INTERNAL MEDICINE
Payer: COMMERCIAL

## 2025-08-14 VITALS — BODY MASS INDEX: 41.87 KG/M2 | WEIGHT: 293 LBS

## 2025-08-14 DIAGNOSIS — R06.02 SOB (SHORTNESS OF BREATH) ON EXERTION: ICD-10-CM

## 2025-08-14 DIAGNOSIS — I10 PRIMARY HYPERTENSION: ICD-10-CM

## 2025-08-14 DIAGNOSIS — R07.89 CHEST PAIN, ATYPICAL: ICD-10-CM

## 2025-08-14 DIAGNOSIS — R00.9 ABNORMAL HEART RATE: ICD-10-CM

## 2025-08-14 LAB
ECHO AO ASC DIAM: 3.3 CM
ECHO AV CUSP MM: 2 CM
ECHO AV PEAK GRADIENT: 7 MMHG
ECHO AV PEAK VELOCITY: 1.3 M/S
ECHO AV VELOCITY RATIO: 0.77
ECHO EST RA PRESSURE: 3 MMHG
ECHO LA AREA 2C: 14.5 CM2
ECHO LA AREA 4C: 15.1 CM2
ECHO LA DIAMETER: 4.1 CM
ECHO LA MAJOR AXIS: 5.6 CM
ECHO LA MINOR AXIS: 5 CM
ECHO LA VOL BP: 35 ML (ref 22–52)
ECHO LA VOL MOD A2C: 34 ML (ref 22–52)
ECHO LA VOL MOD A4C: 33 ML (ref 22–52)
ECHO LV E' LATERAL VELOCITY: 11.5 CM/S
ECHO LV E' SEPTAL VELOCITY: 6.6 CM/S
ECHO LV EF PHYSICIAN: 60 %
ECHO LV FRACTIONAL SHORTENING: 37 % (ref 28–44)
ECHO LV INTERNAL DIMENSION DIASTOLIC: 4.9 CM (ref 3.9–5.3)
ECHO LV INTERNAL DIMENSION SYSTOLIC: 3.1 CM
ECHO LV ISOVOLUMETRIC RELAXATION TIME (IVRT): 70 MS
ECHO LV IVSD: 1.2 CM (ref 0.6–0.9)
ECHO LV MASS 2D: 213.3 G (ref 67–162)
ECHO LV POSTERIOR WALL DIASTOLIC: 1.1 CM (ref 0.6–0.9)
ECHO LV RELATIVE WALL THICKNESS RATIO: 0.45
ECHO LVOT PEAK GRADIENT: 4 MMHG
ECHO LVOT PEAK VELOCITY: 1 M/S
ECHO MV A VELOCITY: 0.96 M/S
ECHO MV E DECELERATION TIME (DT): 195 MS
ECHO MV E VELOCITY: 0.88 M/S
ECHO MV E/A RATIO: 0.92
ECHO MV E/E' LATERAL: 7.65
ECHO MV E/E' RATIO (AVERAGED): 10.49
ECHO MV E/E' SEPTAL: 13.33
ECHO PV MAX VELOCITY: 0.9 M/S
ECHO PV PEAK GRADIENT: 3 MMHG
ECHO RIGHT VENTRICULAR SYSTOLIC PRESSURE (RVSP): 34 MMHG
ECHO RV INTERNAL DIMENSION: 2.5 CM
ECHO RV TAPSE: 2 CM (ref 1.7–?)
ECHO TV E WAVE: 0.6 M/S
ECHO TV REGURGITANT MAX VELOCITY: 2.78 M/S
ECHO TV REGURGITANT PEAK GRADIENT: 31 MMHG
STRESS ANGINA INDEX: 0
STRESS BASELINE DIAS BP: 103 MMHG
STRESS BASELINE HR: 76 BPM
STRESS BASELINE ST DEPRESSION: 0 MM
STRESS BASELINE SYS BP: 147 MMHG
STRESS ESTIMATED WORKLOAD: 7 METS
STRESS EXERCISE DUR MIN: 6 MIN
STRESS EXERCISE DUR SEC: 0 SEC
STRESS PEAK DIAS BP: 50 MMHG
STRESS PEAK SYS BP: 187 MMHG
STRESS PERCENT HR ACHIEVED: 88 %
STRESS POST PEAK HR: 171 BPM
STRESS RATE PRESSURE PRODUCT: NORMAL BPM*MMHG
STRESS SR DUKE TREADMILL SCORE: 6
STRESS ST DEPRESSION: 0 MM
STRESS STAGE 1 BP: NORMAL MMHG
STRESS STAGE 1 DURATION: 3 MIN:SEC
STRESS STAGE 1 HR: 130 BPM
STRESS STAGE 2 BP: NORMAL MMHG
STRESS STAGE 2 COMMENTS: NORMAL
STRESS STAGE 2 DURATION: 3 MIN:SEC
STRESS STAGE 2 HR: 166 BPM
STRESS STAGE RECOVERY 1 COMMENTS: NORMAL
STRESS STAGE RECOVERY 1 DURATION: 0 MIN:SEC
STRESS STAGE RECOVERY 1 HR: 169 BPM
STRESS STAGE RECOVERY 2 BP: NORMAL MMHG
STRESS STAGE RECOVERY 2 COMMENTS: NORMAL
STRESS STAGE RECOVERY 2 DURATION: 3 MIN:SEC
STRESS STAGE RECOVERY 2 HR: 123 BPM
STRESS STAGE RECOVERY 3 BP: NORMAL MMHG
STRESS STAGE RECOVERY 3 DURATION: 5 MIN:SEC
STRESS STAGE RECOVERY 3 HR: 108 BPM
STRESS STAGE RECOVERY 4 BP: NORMAL MMHG
STRESS STAGE RECOVERY 4 COMMENTS: NORMAL
STRESS STAGE RECOVERY 4 DURATION: 2 MIN:SEC
STRESS STAGE RECOVERY 4 HR: 103 BPM
STRESS STANDING DIAS BP: 93 MMHG
STRESS STANDING HR: 93 BPM
STRESS STANDING SYS BP: 154 MMHG
STRESS TARGET HR: 194 BPM

## 2025-08-14 PROCEDURE — 93306 TTE W/DOPPLER COMPLETE: CPT

## 2025-08-14 PROCEDURE — 93017 CV STRESS TEST TRACING ONLY: CPT

## 2025-08-18 ENCOUNTER — OFFICE VISIT (OUTPATIENT)
Dept: CARDIOLOGY CLINIC | Age: 25
End: 2025-08-18
Payer: COMMERCIAL

## 2025-08-18 VITALS
SYSTOLIC BLOOD PRESSURE: 129 MMHG | HEIGHT: 70 IN | BODY MASS INDEX: 41.95 KG/M2 | WEIGHT: 293 LBS | HEART RATE: 96 BPM | DIASTOLIC BLOOD PRESSURE: 87 MMHG

## 2025-08-18 DIAGNOSIS — R00.9 ABNORMAL HEART RATE: Primary | ICD-10-CM

## 2025-08-18 DIAGNOSIS — R06.02 SOB (SHORTNESS OF BREATH) ON EXERTION: ICD-10-CM

## 2025-08-18 DIAGNOSIS — I10 PRIMARY HYPERTENSION: ICD-10-CM

## 2025-08-18 PROCEDURE — 3074F SYST BP LT 130 MM HG: CPT | Performed by: INTERNAL MEDICINE

## 2025-08-18 PROCEDURE — 3079F DIAST BP 80-89 MM HG: CPT | Performed by: INTERNAL MEDICINE

## 2025-08-18 PROCEDURE — 99214 OFFICE O/P EST MOD 30 MIN: CPT | Performed by: INTERNAL MEDICINE
